# Patient Record
Sex: FEMALE | Race: WHITE | NOT HISPANIC OR LATINO | URBAN - METROPOLITAN AREA
[De-identification: names, ages, dates, MRNs, and addresses within clinical notes are randomized per-mention and may not be internally consistent; named-entity substitution may affect disease eponyms.]

---

## 2020-05-08 NOTE — PATIENT DISCUSSION
- Discussed additional future treatment options including ointments, Restasis, Xiidra, punctal plugs, and serum tears if symptoms fail to improve

## 2020-05-08 NOTE — PATIENT DISCUSSION
- Discussed the r/b/a of surgery and pt wishes to think about surgery for now. Provided with a cataract packet. May call at any time to schedule surgery and preop.

## 2020-10-23 ENCOUNTER — IMPORTED ENCOUNTER (OUTPATIENT)
Dept: URBAN - METROPOLITAN AREA CLINIC 50 | Facility: CLINIC | Age: 40
End: 2020-10-23

## 2020-10-26 ENCOUNTER — IMPORTED ENCOUNTER (OUTPATIENT)
Dept: URBAN - METROPOLITAN AREA CLINIC 50 | Facility: CLINIC | Age: 40
End: 2020-10-26

## 2020-11-02 ENCOUNTER — IMPORTED ENCOUNTER (OUTPATIENT)
Dept: URBAN - METROPOLITAN AREA CLINIC 50 | Facility: CLINIC | Age: 40
End: 2020-11-02

## 2020-11-02 NOTE — PATIENT DISCUSSION
"""Patient was seen by neurologist last Thurs (10/29/20) and is waiting to be scheduled for MRI of ""

## 2021-04-17 ASSESSMENT — VISUAL ACUITY
OS_CC: J1+
OD_CC: 20/30-1
OD_CC: 20/25
OD_CC: J1+
OS_CC: 20/25+
OS_CC: 20/30

## 2021-04-17 ASSESSMENT — TONOMETRY
OS_IOP_MMHG: 14
OS_IOP_MMHG: 14
OD_IOP_MMHG: 15
OD_IOP_MMHG: 14

## 2023-04-27 ENCOUNTER — COMPREHENSIVE EXAM (OUTPATIENT)
Dept: URBAN - METROPOLITAN AREA CLINIC 48 | Facility: LOCATION | Age: 43
End: 2023-04-27

## 2023-04-27 ENCOUNTER — PREPPED CHART (OUTPATIENT)
Dept: URBAN - METROPOLITAN AREA CLINIC 48 | Facility: LOCATION | Age: 43
End: 2023-04-27

## 2023-04-27 DIAGNOSIS — R51.9: ICD-10-CM

## 2023-04-27 DIAGNOSIS — H52.13: ICD-10-CM

## 2023-04-27 DIAGNOSIS — Z97.3: ICD-10-CM

## 2023-04-27 PROCEDURE — 92014 COMPRE OPH EXAM EST PT 1/>: CPT

## 2023-04-27 PROCEDURE — 92310-1E ESTABLISHED CL PATIENT SPHERICAL SINGLE VISION SOFT LENS EVALUATION

## 2023-04-27 ASSESSMENT — TONOMETRY
OS_IOP_MMHG: 11
OD_IOP_MMHG: 11

## 2023-04-27 ASSESSMENT — VISUAL ACUITY
OD_PH: 20/25
OS_GLARE: 20/20
OD_CC: 20/50-2
OD_GLARE: 20/20
OS_CC: 20/25-2
OD_CC: 20/20-2
OU_CC: J1+ @16IN
OS_CC: 20/25
OU_CC: J1+ @16IN